# Patient Record
Sex: MALE | Race: WHITE | NOT HISPANIC OR LATINO | Employment: UNEMPLOYED
[De-identification: names, ages, dates, MRNs, and addresses within clinical notes are randomized per-mention and may not be internally consistent; named-entity substitution may affect disease eponyms.]

---
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
## 2025-01-16 ENCOUNTER — OFFICE VISIT (OUTPATIENT)
Dept: ORTHOPEDICS | Facility: CLINIC | Age: 20
End: 2025-01-16
Payer: COMMERCIAL

## 2025-01-16 VITALS
DIASTOLIC BLOOD PRESSURE: 81 MMHG | HEIGHT: 72 IN | BODY MASS INDEX: 23.03 KG/M2 | HEART RATE: 66 BPM | RESPIRATION RATE: 18 BRPM | SYSTOLIC BLOOD PRESSURE: 143 MMHG | WEIGHT: 170 LBS

## 2025-01-16 DIAGNOSIS — M89.8X1 PERISCAPULAR PAIN: ICD-10-CM

## 2025-01-16 DIAGNOSIS — M25.512 ACUTE PAIN OF LEFT SHOULDER: Primary | ICD-10-CM

## 2025-01-16 NOTE — LETTER
January 16, 2025      Israel Kumari  [address] [facility] [address]        To Whom It May Concern:    Israel Kumari was seen in our clinic at our clinic today for shoulder pain. Return to work on 1/20/25. Restrictions no heavy lifting with the left arm. Return to clinic in 4 weeks.      Sincerely,        Jong White MD    Electronically signed

## 2025-01-16 NOTE — LETTER
1/16/2025      Israel Kumari  [address] [facility] [address]      Dear Colleague,    Thank you for referring your patient, Israel Kumari, to the [facility]. Please see a copy of my visit note below.    Israel Kumari is a 19 year old male who is seen as self referral for left posterior shoulder pain.  This started 5 days ago with lifting things at home.  He has had sharp aching burning pain in the left shoulder blade area since then.  He rates pain at 7 out of 10.  It is worse with use.  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  This was diagnosed in 2022 and was treated conservatively with strengthening.  He reports this improved.    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    History reviewed. No pertinent past medical history.    History reviewed. No pertinent surgical history.    Family History   Problem Relation Age of Onset     Heart Defect Mother 32        ASD     Family History Negative No family hx of        Social History     Socioeconomic History     Marital status: Single     Spouse name: Not on file     Number of children: Not on file     Years of education: Not on file     Highest education level: Not on file   Occupational History     Not on file   Tobacco Use     Smoking status: Never     Passive exposure: Yes     Smokeless tobacco: Never     Tobacco comments:     outside of home   Substance and Sexual Activity     Alcohol use: No     Drug use: No     Sexual activity: Never   Other Topics Concern     Not on file   Social History Narrative     Not on file     Social Drivers of Health     Financial Resource Strain: Not on file   Food Insecurity: Not on file   Transportation Needs: Not on file   Physical Activity: Not on file   Stress: Not on file   Social Connections: Not on file   Interpersonal Safety: Not on file   Housing Stability: Not on file       Current Outpatient Medications    Medication Sig Dispense Refill     clindamycin (CLEOCIN T) 1 % external lotion Apply topically daily To big red bumps on the face in the morning as needed 120 mL 5     IBUPROFEN PO        tretinoin (RETIN-A) 0.025 % external cream Apply a pea sized amount to the face at night 45 g 3       Allergies   Allergen Reactions     Amoxicillin Rash     Also had J&J lavendar lotion 2 days prior.  I think this is related to the Amoxicillin.       REVIEW OF SYSTEMS:  CONSTITUTIONAL:  NEGATIVE for fever, chills, change in weight, not feeling tired  SKIN:  NEGATIVE for worrisome rashes, no skin lumps, no skin ulcers and no non-healing wounds  EYES:  NEGATIVE for vision changes or irritation.  ENT/MOUTH:  NEGATIVE.  No hearing loss, no hoarseness, no difficulty swallowing.  RESP:  NEGATIVE. No cough or shortness of breath.  CV:  NEGATIVE for chest pain, palpitations or peripheral edema  GI:  NEGATIVE for nausea, abdominal pain, heartburn, or change in bowel habits  :  Negative. No dysuria, no hematuria  MUSCULOSKELETAL:  See HPI above  NEURO:  NEGATIVE . No headaches, no dizziness,  no numbness  ENDOCRINE:  NEGATIVE for temperature intolerance, skin/hair changes  HEME/ALLERGY/IMMUNE:  NEGATIVE for bleeding problems  PSYCHIATRIC:  NEGATIVE. no anxiety, no depression.     Exam:  Vitals: BP (!) 143/81   Pulse 66   Resp 18   Ht 1.829 m (6')   Wt 77.1 kg (170 lb)   BMI 23.06 kg/m    BMI= Body mass index is 23.06 kg/m .  Constitutional:  healthy, alert and no distress  Neuro: Alert and Oriented x 3, no focal defects.  Psych: Affect normal   Respiratory: Breathing not labored.  Cardiovascular: normal peripheral pulses  Lymph: no adenopathy  Skin: No rashes,worrisome lesions or skin problems  There is tenderness along the medial border of the scapula.  He has some tenderness with pressure under the scapula especially with raising the arm overhead.  He has no tenderness at the AC joint or subacromial space.  He does not have pain  or weakness with resisted internal rotation, external rotation, or abduction related to the shoulder.  Some of these do cause pain at the back of the scapula.  He has full range of motion of neck without pain.    Assessment:  periscapular muscle strain.  Plan:  Physical Therapy for strengthening .  Resume activity as tolerated.    Again, thank you for allowing me to participate in the care of your patient.        Sincerely,        Jong White MD    Electronically signed

## 2025-01-17 NOTE — PROGRESS NOTES
Israel Kumari is a 19 year old male who is seen as self referral for left posterior shoulder pain.  This started 5 days ago with lifting things at home.  He has had sharp aching burning pain in the left shoulder blade area since then.  He rates pain at 7 out of 10.  It is worse with use.  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  This was diagnosed in 2022 and was treated conservatively with strengthening.  He reports this improved.    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    History reviewed. No pertinent past medical history.    History reviewed. No pertinent surgical history.    Family History   Problem Relation Age of Onset    Heart Defect Mother 32        ASD    Family History Negative No family hx of        Social History     Socioeconomic History    Marital status: Single     Spouse name: Not on file    Number of children: Not on file    Years of education: Not on file    Highest education level: Not on file   Occupational History    Not on file   Tobacco Use    Smoking status: Never     Passive exposure: Yes    Smokeless tobacco: Never    Tobacco comments:     outside of home   Substance and Sexual Activity    Alcohol use: No    Drug use: No    Sexual activity: Never   Other Topics Concern    Not on file   Social History Narrative    Not on file     Social Drivers of Health     Financial Resource Strain: Not on file   Food Insecurity: Not on file   Transportation Needs: Not on file   Physical Activity: Not on file   Stress: Not on file   Social Connections: Not on file   Interpersonal Safety: Not on file   Housing Stability: Not on file       Current Outpatient Medications   Medication Sig Dispense Refill    clindamycin (CLEOCIN T) 1 % external lotion Apply topically daily To big red bumps on the face in the morning as needed 120 mL 5    IBUPROFEN PO       tretinoin (RETIN-A) 0.025 % external cream Apply a pea sized amount to the  face at night 45 g 3       Allergies   Allergen Reactions    Amoxicillin Rash     Also had J&J lavendar lotion 2 days prior.  I think this is related to the Amoxicillin.       REVIEW OF SYSTEMS:  CONSTITUTIONAL:  NEGATIVE for fever, chills, change in weight, not feeling tired  SKIN:  NEGATIVE for worrisome rashes, no skin lumps, no skin ulcers and no non-healing wounds  EYES:  NEGATIVE for vision changes or irritation.  ENT/MOUTH:  NEGATIVE.  No hearing loss, no hoarseness, no difficulty swallowing.  RESP:  NEGATIVE. No cough or shortness of breath.  CV:  NEGATIVE for chest pain, palpitations or peripheral edema  GI:  NEGATIVE for nausea, abdominal pain, heartburn, or change in bowel habits  :  Negative. No dysuria, no hematuria  MUSCULOSKELETAL:  See HPI above  NEURO:  NEGATIVE . No headaches, no dizziness,  no numbness  ENDOCRINE:  NEGATIVE for temperature intolerance, skin/hair changes  HEME/ALLERGY/IMMUNE:  NEGATIVE for bleeding problems  PSYCHIATRIC:  NEGATIVE. no anxiety, no depression.     Exam:  Vitals: BP (!) 143/81   Pulse 66   Resp 18   Ht 1.829 m (6')   Wt 77.1 kg (170 lb)   BMI 23.06 kg/m    BMI= Body mass index is 23.06 kg/m .  Constitutional:  healthy, alert and no distress  Neuro: Alert and Oriented x 3, no focal defects.  Psych: Affect normal   Respiratory: Breathing not labored.  Cardiovascular: normal peripheral pulses  Lymph: no adenopathy  Skin: No rashes,worrisome lesions or skin problems  There is tenderness along the medial border of the scapula.  He has some tenderness with pressure under the scapula especially with raising the arm overhead.  He has no tenderness at the AC joint or subacromial space.  He does not have pain or weakness with resisted internal rotation, external rotation, or abduction related to the shoulder.  Some of these do cause pain at the back of the scapula.  He has full range of motion of neck without pain.    Assessment:  periscapular muscle strain.  Plan:   Physical Therapy for strengthening .  Resume activity as tolerated.

## 2025-05-12 ENCOUNTER — HOSPITAL ENCOUNTER (EMERGENCY)
Facility: CLINIC | Age: 20
Discharge: HOME | End: 2025-05-12
Attending: STUDENT IN AN ORGANIZED HEALTH CARE EDUCATION/TRAINING PROGRAM | Admitting: STUDENT IN AN ORGANIZED HEALTH CARE EDUCATION/TRAINING PROGRAM
Payer: COMMERCIAL

## 2025-05-12 ENCOUNTER — APPOINTMENT (OUTPATIENT)
Dept: CT IMAGING | Facility: CLINIC | Age: 20
End: 2025-05-12
Attending: STUDENT IN AN ORGANIZED HEALTH CARE EDUCATION/TRAINING PROGRAM
Payer: COMMERCIAL

## 2025-05-12 VITALS
HEART RATE: 78 BPM | RESPIRATION RATE: 17 BRPM | BODY MASS INDEX: 26.77 KG/M2 | OXYGEN SATURATION: 100 % | DIASTOLIC BLOOD PRESSURE: 98 MMHG | WEIGHT: 202 LBS | TEMPERATURE: 98.6 F | SYSTOLIC BLOOD PRESSURE: 144 MMHG | HEIGHT: 73 IN

## 2025-05-12 DIAGNOSIS — R10.9 RIGHT FLANK PAIN: ICD-10-CM

## 2025-05-12 LAB
ALBUMIN UR-MCNC: 10 MG/DL
APPEARANCE UR: CLEAR
BILIRUB UR QL STRIP: NEGATIVE
C TRACH DNA SPEC QL PROBE+SIG AMP: NEGATIVE
COLOR UR AUTO: YELLOW
GLUCOSE UR STRIP-MCNC: NEGATIVE MG/DL
HGB UR QL STRIP: ABNORMAL
KETONES UR STRIP-MCNC: NEGATIVE MG/DL
LEUKOCYTE ESTERASE UR QL STRIP: NEGATIVE
MUCOUS THREADS #/AREA URNS LPF: PRESENT /LPF
N GONORRHOEA DNA SPEC QL NAA+PROBE: NEGATIVE
NITRATE UR QL: NEGATIVE
PH UR STRIP: 5.5 [PH] (ref 5–7)
RBC URINE: 4 /HPF
SP GR UR STRIP: 1.03 (ref 1–1.03)
SPECIMEN TYPE: NORMAL
UROBILINOGEN UR STRIP-MCNC: NORMAL MG/DL
WBC URINE: 3 /HPF

## 2025-05-12 PROCEDURE — 99284 EMERGENCY DEPT VISIT MOD MDM: CPT | Performed by: STUDENT IN AN ORGANIZED HEALTH CARE EDUCATION/TRAINING PROGRAM

## 2025-05-12 PROCEDURE — 250N000013 HC RX MED GY IP 250 OP 250 PS 637: Performed by: STUDENT IN AN ORGANIZED HEALTH CARE EDUCATION/TRAINING PROGRAM

## 2025-05-12 PROCEDURE — 87591 N.GONORRHOEAE DNA AMP PROB: CPT | Performed by: STUDENT IN AN ORGANIZED HEALTH CARE EDUCATION/TRAINING PROGRAM

## 2025-05-12 PROCEDURE — 99284 EMERGENCY DEPT VISIT MOD MDM: CPT | Mod: 25 | Performed by: STUDENT IN AN ORGANIZED HEALTH CARE EDUCATION/TRAINING PROGRAM

## 2025-05-12 PROCEDURE — 74176 CT ABD & PELVIS W/O CONTRAST: CPT

## 2025-05-12 PROCEDURE — 81001 URINALYSIS AUTO W/SCOPE: CPT | Performed by: STUDENT IN AN ORGANIZED HEALTH CARE EDUCATION/TRAINING PROGRAM

## 2025-05-12 RX ORDER — DOXYCYCLINE HYCLATE 100 MG
100 TABLET ORAL 2 TIMES DAILY
Qty: 20 TABLET | Refills: 0 | Status: SHIPPED | OUTPATIENT
Start: 2025-05-12

## 2025-05-12 RX ORDER — ACETAMINOPHEN 500 MG
1000 TABLET ORAL ONCE
Status: COMPLETED | OUTPATIENT
Start: 2025-05-12 | End: 2025-05-12

## 2025-05-12 RX ADMIN — ACETAMINOPHEN 1000 MG: 500 TABLET ORAL at 09:04

## 2025-05-12 ASSESSMENT — ENCOUNTER SYMPTOMS
FATIGUE: 0
ABDOMINAL PAIN: 1
APPETITE CHANGE: 0
NECK STIFFNESS: 0
EYE REDNESS: 0
DIARRHEA: 0
HEADACHES: 0
HEMATURIA: 0
RHINORRHEA: 0
DIZZINESS: 0
COUGH: 0
SHORTNESS OF BREATH: 0
CHILLS: 0
DYSURIA: 1
NAUSEA: 0
ARTHRALGIAS: 0
MYALGIAS: 0
ACTIVITY CHANGE: 0
VOMITING: 0
FEVER: 0
SORE THROAT: 0

## 2025-05-12 ASSESSMENT — COLUMBIA-SUICIDE SEVERITY RATING SCALE - C-SSRS
6. HAVE YOU EVER DONE ANYTHING, STARTED TO DO ANYTHING, OR PREPARED TO DO ANYTHING TO END YOUR LIFE?: NO
2. HAVE YOU ACTUALLY HAD ANY THOUGHTS OF KILLING YOURSELF IN THE PAST MONTH?: NO
1. IN THE PAST MONTH, HAVE YOU WISHED YOU WERE DEAD OR WISHED YOU COULD GO TO SLEEP AND NOT WAKE UP?: NO

## 2025-05-12 ASSESSMENT — ACTIVITIES OF DAILY LIVING (ADL): ADLS_ACUITY_SCORE: 41

## 2025-05-12 NOTE — DISCHARGE INSTRUCTIONS
Imaging was reassuring today however he did have some blood in your urine this could be secondary to a possible infection underlying the pathology such as chlamydia or gonorrhea your results are pending please follow-up with these on your MyChart.  Otherwise no signs of obvious infection.  There is no signs of bowel obstruction renal stones hepatobiliary abnormalities or appendix inflammation.  Please return if you feel symptoms are worsening and/or follow-up with your primary care team the next 3 to 5 days for reevaluation.

## 2025-05-12 NOTE — ED PROVIDER NOTES
History     Chief Complaint   Patient presents with    Flank Pain    Back Pain    Dysuria     HPI  Israel Kumari is a 19 year old male who with right flank pain dysuria.  Initially started with dysuria for the past 5 days followed by right flank pain last night.  He has not had any fevers nausea vomiting chest pain chills breathing shortness of breath cough.  He has not had any change in his urinations or stooling aside from the mild dysuria with urinating.  He has not had any penile discharge.  Is currently going through a break-up but does does not condone any history of STDs.  He has no history of trauma or injury.  He has no history of abdominal surgeries.    Allergies:  Allergies   Allergen Reactions    Amoxicillin Rash     Also had J&J lavendar lotion 2 days prior.  I think this is related to the Amoxicillin.       Problem List:    Patient Active Problem List    Diagnosis Date Noted    Sprain of anterior talofibular ligament of left ankle, initial encounter 05/03/2024     Priority: Medium    Acute pain of left shoulder 05/03/2024     Priority: Medium    Personal history of allergy to other specified medicinal agents 09/08/2006     Priority: Medium        Past Medical History:    No past medical history on file.    Past Surgical History:    No past surgical history on file.    Family History:    Family History   Problem Relation Age of Onset    Heart Defect Mother 32        ASD    Family History Negative No family hx of        Social History:  Marital Status:  Single [1]  Social History     Tobacco Use    Smoking status: Never     Passive exposure: Yes    Smokeless tobacco: Never    Tobacco comments:     outside of home   Substance Use Topics    Alcohol use: No    Drug use: No        Medications:    clindamycin (CLEOCIN T) 1 % external lotion  doxycycline hyclate (VIBRA-TABS) 100 MG tablet  IBUPROFEN PO  tretinoin (RETIN-A) 0.025 % external cream          Review of Systems   Constitutional:  Negative for  "activity change, appetite change, chills, fatigue and fever.   HENT:  Negative for congestion, rhinorrhea and sore throat.    Eyes:  Negative for redness.   Respiratory:  Negative for cough and shortness of breath.    Cardiovascular:  Negative for chest pain.   Gastrointestinal:  Positive for abdominal pain. Negative for diarrhea, nausea and vomiting.   Genitourinary:  Positive for dysuria. Negative for hematuria.   Musculoskeletal:  Negative for arthralgias, myalgias and neck stiffness.   Skin:  Negative for rash.   Neurological:  Negative for dizziness and headaches.   All other systems reviewed and are negative.      Physical Exam   BP: (!) 144/98  Pulse: 78  Temp: 98.6  F (37  C)  Resp: 17  Height: 185.4 cm (6' 1\")  Weight: 91.6 kg (202 lb)  SpO2: 100 %      Physical Exam  Vitals and nursing note reviewed.   Constitutional:       General: He is not in acute distress.     Appearance: Normal appearance. He is not toxic-appearing.   HENT:      Head: Normocephalic and atraumatic.      Nose: Nose normal.      Mouth/Throat:      Mouth: Mucous membranes are moist.   Eyes:      General: No scleral icterus.     Conjunctiva/sclera: Conjunctivae normal.   Cardiovascular:      Rate and Rhythm: Normal rate.      Heart sounds: Normal heart sounds.   Pulmonary:      Effort: Pulmonary effort is normal. No respiratory distress.      Breath sounds: Normal breath sounds.   Abdominal:      General: Abdomen is flat. Bowel sounds are normal. There is no distension.      Palpations: Abdomen is soft.      Tenderness: There is no abdominal tenderness. There is no right CVA tenderness, left CVA tenderness, guarding or rebound.      Hernia: No hernia is present.       Musculoskeletal:         General: No deformity.      Cervical back: Neck supple.   Skin:     General: Skin is warm.      Capillary Refill: Capillary refill takes less than 2 seconds.   Neurological:      General: No focal deficit present.      Mental Status: He is alert and " oriented to person, place, and time.   Psychiatric:         Mood and Affect: Mood normal.         ED Course        Procedures              Results for orders placed or performed during the hospital encounter of 05/12/25 (from the past 24 hours)   UA with Microscopic reflex to Culture    Specimen: Urine, NOS   Result Value Ref Range    Color Urine Yellow Colorless, Straw, Light Yellow, Yellow    Appearance Urine Clear Clear    Glucose Urine Negative Negative mg/dL    Bilirubin Urine Negative Negative    Ketones Urine Negative Negative mg/dL    Specific Gravity Urine 1.032 1.003 - 1.035    Blood Urine Small (A) Negative    pH Urine 5.5 5.0 - 7.0    Protein Albumin Urine 10 (A) Negative mg/dL    Urobilinogen Urine Normal Normal mg/dL    Nitrite Urine Negative Negative    Leukocyte Esterase Urine Negative Negative    Mucus Urine Present (A) None Seen /LPF    RBC Urine 4 (H) <=2 /HPF    WBC Urine 3 <=5 /HPF    Narrative    Urine Culture not indicated   CT Abdomen Pelvis w/o Contrast    Narrative    EXAM: CT ABDOMEN PELVIS W/O CONTRAST  LOCATION: [facility] [place]  DATE: 5/12/2025    INDICATION: right flank pain  COMPARISON: None.  TECHNIQUE: CT scan of the abdomen and pelvis was performed without IV contrast. Multiplanar reformats were obtained. Dose reduction techniques were used.  CONTRAST: None.    FINDINGS:   LOWER CHEST: Normal.    HEPATOBILIARY: Normal.    PANCREAS: Normal.    SPLEEN: Normal.    ADRENAL GLANDS: Normal.    KIDNEYS/BLADDER: No stone or hydronephrosis. Urinary bladder is decompressed.    BOWEL: Normal.    LYMPH NODES: Normal.    VASCULATURE: Normal.    PELVIC ORGANS: Normal.    MUSCULOSKELETAL: Normal.      Impression    IMPRESSION:   1. No finding to account for flank pain.           Medications   acetaminophen (TYLENOL) tablet 1,000 mg (1,000 mg Oral $Given 5/12/25 0904)       Assessments & Plan (with Medical Decision Making)     I have reviewed the nursing notes.    I have  reviewed the findings, diagnosis, plan and need for follow up with the patient.      Medical Decision Making  Israel Kumari is a 19 year old male who with right flank pain dysuria.  Initially started with dysuria for the past 5 days followed by right flank pain last night.  He has not had any fevers nausea vomiting chest pain chills breathing shortness of breath cough.  He has not had any change in his urinations or stooling aside from the mild dysuria with urinating.  He has not had any penile discharge.  Is currently going through a break-up but does does not condone any history of STDs.  He has no history of trauma or injury.  He has no history of abdominal surgeries.    Vitals are reassuring with a blood pressure of 144/98 temperature of 98.6 pulse of 70 and oxygen saturation 100% room air.  He is got no tenderness around the abdomen anteriorly with no rebound guarding rigidity pulsatility or masses herniations or any other acute signs of overlying skin trauma or bruising.  He notes his scrotal examination is unremarkable is got no recent discharge but with the dysuria is concerned of possible underlying infection as he is going through a current relationship situation.  He has no flank tenderness on palpation and the pain is mostly on the lateral aspect of his flank versus it will being over the right costovertebral angle.  Differential also includes renal stones however with the examination and history unlikely be associated with cholecystitis or appendicitis.  Underlying constipation could also be a factor however noting stooling changes been unchanged.  Will evaluate urinalysis with at least chlamydia and gonorrhea screen.    CT imaging without any signs of obvious medical surgical emergencies.  Urinalysis did have some mild blood in it however no signs of obvious infection.  Patient's vitals remained stable.  We discussed supportive care measures and continued observation with outpatient follow-up and  return precautions.  We discussed including gonorrhea supportive treatment today versus holding off until results.  Patient would prefer to hold off and will follow-up with her primary care doctor in the next 2 to 3 days once return of results if necessary.  No other acute concerns and patient was discharged home with mom.    New Prescriptions    DOXYCYCLINE HYCLATE (VIBRA-TABS) 100 MG TABLET    Take 1 tablet (100 mg) by mouth 2 times daily.       Final diagnoses:   Right flank pain       5/12/2025   [facility] EMERGENCY DEPT       Susanne Edmondson MD  05/12/25 [id number]

## 2025-05-12 NOTE — ED TRIAGE NOTES
PT presents for concerns of right flank pain and lower back pain that started last night, pain score 8/10 pain score. PT also reports dysuria for 5 days now. Denies N&V.

## 2025-05-12 NOTE — Clinical Note
Israel Milianmegan was seen and treated in our emergency department on 5/12/2025.  He may return to work on 05/14/2025.       If you have any questions or concerns, please don't hesitate to call.      Susanne Edmondson MD